# Patient Record
Sex: FEMALE
[De-identification: names, ages, dates, MRNs, and addresses within clinical notes are randomized per-mention and may not be internally consistent; named-entity substitution may affect disease eponyms.]

---

## 2018-01-03 ENCOUNTER — HOSPITAL ENCOUNTER (OUTPATIENT)
Dept: HOSPITAL 5 - SPVIMAG | Age: 48
Discharge: HOME | End: 2018-01-03
Attending: SURGERY
Payer: COMMERCIAL

## 2018-01-03 DIAGNOSIS — C50.312: Primary | ICD-10-CM

## 2018-01-03 PROCEDURE — C8908 MRI W/O FOL W/CONT, BREAST,: HCPCS

## 2018-01-03 PROCEDURE — A9577 INJ MULTIHANCE: HCPCS

## 2018-01-03 PROCEDURE — 77059: CPT

## 2018-01-05 NOTE — MAGNETIC RESONANCE REPORT
BILATERAL BREAST MRI WITHOUT AND WITH CONTRAST: 01/03/18 12:54:00



CLINICAL: Newly diagnosed left breast cancer.  Status post ultrasound 

guided needle biopsy on 12/12/17 with findings of invasive mammary 

carcinoma with ductal and lobular features, grade 2.



COMPARISON:12/12/17 left mammogram.



TECHNIQUE: Axial 1.0-mm T1 without,  axial high resolution 2.0-mm T2 

and axial 1.0-mm dynamic Vibrant high-resolution postcontrast T1 fat 

saturation sequences on a 1.5 Marlene magnet.  The examination was 

performed with an 8 channel dedicated [employer] breast coil. Post 

processing with CAD and subtraction was performed on an [employer] 

workstation.  14.0 cc of Multihance was injected without incident via a 

right antecubital vein 22-gauge INT for the contrast portion of the 

exam. Consent was obtained prior to the administration of the contrast. 

 



FINDINGS:



Right: Mild background parenchymal enhancement.  No mass or suspicious 

enhancement.  No suspicious right axillary or right internal mammary 

lymph nodes.



Left: Mild background parenchymal enhancement.  The known cancer is an 

irregular enhancing mass at 7 o'clock 6.6 cm from the nipple measuring 

16.2 x 10.6 x 9.3 mm.  It demonstrates heterogeneous enhancement with 

mixed kinetics, 148% peak enhancement in 10% type III washout.  Lesion 

2 is a suspicious irregular enhancing mass at 6:30 o'clock 5.3 cm from 

the nipple measuring 11.4 x 6.3 x 4.6 mm.  It demonstrates 

heterogeneous enhancement with mixed kinetics, 154% peak enhancement in 

1% type III washout.  Lesion 3 is an irregular enhancing mass at 5 

o'clock 6.1 cm from the nipple measuring 15.6 x 11.3 x 7.3 mm.  It 

demonstrates heterogeneous enhancement with mixed kinetics, 199% peak 

enhancement and 7% type III washout.

The known cancer and lesion 2 span approximately 3 cm in a ductal 

orientation toward the nipple.  Lesion 3 is approximately 2.8 cm 

lateral to the known cancer and approximately 2.4 cm posterior and 

lateral from lesion 2.  No suspicious left axillary or left internal 

mammary lymph nodes.



IMPRESSION: Known left breast cancer at 7 o'clock and 2 additional 

highly suspicious masses of the left breast at 6 o'clock and 5 o'clock. 

 Negative right breast.  No suspicious lymph nodes.



RIGHT BI-RADS  1 -- Negative





LEFT BI-RADS 6 -- Known Cancer

## 2018-01-25 ENCOUNTER — HOSPITAL ENCOUNTER (OUTPATIENT)
Dept: HOSPITAL 5 - SPVIMAG | Age: 48
Discharge: HOME | End: 2018-01-25
Attending: SURGERY
Payer: COMMERCIAL

## 2018-01-25 DIAGNOSIS — C50.312: ICD-10-CM

## 2018-01-25 DIAGNOSIS — N64.9: Primary | ICD-10-CM

## 2018-01-25 PROCEDURE — 19086 BX BREAST ADD LESION MR IMAG: CPT

## 2018-01-25 PROCEDURE — 88305 TISSUE EXAM BY PATHOLOGIST: CPT

## 2018-01-25 PROCEDURE — A9577 INJ MULTIHANCE: HCPCS

## 2018-01-25 PROCEDURE — 19085 BX BREAST 1ST LESION MR IMAG: CPT

## 2018-01-25 PROCEDURE — 77065 DX MAMMO INCL CAD UNI: CPT

## 2018-01-25 NOTE — MAGNETIC RESONANCE REPORT
MRI GUIDED VACUUM ASSISTED CORE BIOPSY AT TWO SITES LEFT BREAST: 

01/25/18 



CLINICAL: Known left breast cancer and 7 o'clock.  2 additional 

suspicious lesions of the left breast.



COMPARISON: 01/03/18 MRI



FINDINGS: Consent for the procedure was obtained.  A Vibrant dynamic 

postcontrast series was performed on a 1.5 Marlene magnet using an 8 

channel Sentinelle dedicated breast coil.  15.0 cc of Multihance was 

injected intravenously without incident via a right antecubital vein 

20-gauge INT. The previously identified additional suspicious lesions 

were localized and targeted using [employer] Sentinelle biopsy software. 



The skin was anesthetized with 1% lidocaine and small dermatotomies 

were performed.  2% lidocaine was administered for deeper anesthesia.  

9-G biopsy was performed with an [employer] vacuum assisted device.  

Imaging demonstrated satisfactory positioning of the probe at the two 

sites and satisfactory samples were obtained.  Clips were placed at 

both sites after confirmation of adequate sampling. The probes were 

removed and hemostasis was achieved with pressure to the sites.  

Sterile dressings were applied.





The patient tolerated the procedure well and there were no apparent 

complications. A two view mammogram demonstrated concordant placement 

of both clips.



The patient left the department in good condition and was given 

instructions instructions for wound care and follow-up. 



IMPRESSION: Uncomplicated MRI biopsy with clip placement at two sites 

left breast.

## 2018-01-25 NOTE — MAGNETIC RESONANCE REPORT
MRI GUIDED VACUUM ASSISTED CORE BIOPSY AT TWO SITES LEFT BREAST: 

01/25/18 



CLINICAL: Known left breast cancer and 7 o'clock.  2 additional 

suspicious lesions of the left breast.



COMPARISON: 01/03/18 MRI



FINDINGS: Consent for the procedure was obtained.  A Vibrant dynamic 

postcontrast series was performed on a 1.5 Marlene magnet using an 8 

channel Sentinelle dedicated breast coil.  15.0 cc of Multihance was 

injected intravenously without incident via a right antecubital vein 

20-gauge INT. The previously identified additional suspicious lesions 

were localized and targeted using [employer] Sentinelle biopsy software. 



The skin was anesthetized with 1% lidocaine and small dermatotomies 

were performed.  2% lidocaine was administered for deeper anesthesia.  

9-G biopsy was performed with an [employer] vacuum assisted device.  

Imaging demonstrated satisfactory positioning of the probe at the two 

sites and satisfactory samples were obtained.  Clips were placed at 

both sites after confirmation of adequate sampling. The probes were 

removed and hemostasis was achieved with pressure to the sites.  

Sterile dressings were applied.





The patient tolerated the procedure well and there were no apparent 

complications. A two view mammogram demonstrated concordant placement 

of both clips.



The patient left the department in good condition and was given 

instructions instructions for wound care and follow-up. 



IMPRESSION: Uncomplicated MRI biopsy with clip placement at two sites 

left breast.

## 2018-01-26 NOTE — MAMMOGRAPHY REPORT
LEFT DIGITAL DIAGNOSTIC MAMMOGRAM: 01/25/18 08:04:00



CLINICAL: For clip placement immediately status post MRI guided needle 

biopsy at 2 sites.



COMPARISON:12/12/17



FINDINGS: Two  biopsy clips at 6 o'clock and 6:30 o'clock correlate 

with the areas biopsied by MRI guidance and they are labeled 1 and 2 

respectively.A third clip correlates with the known cancer at 7 

o'clock. 





IMPRESSION: Concordant clip placement status post MRI biopsy at 2 sites.



BI-RADS CATEGORY:  6 -- Known Cancer



Pathology pending.

## 2018-01-30 ENCOUNTER — HOSPITAL ENCOUNTER (OUTPATIENT)
Dept: HOSPITAL 5 - SPVIMAG | Age: 48
Discharge: HOME | End: 2018-01-30
Attending: SURGERY
Payer: COMMERCIAL

## 2018-01-30 DIAGNOSIS — R92.8: ICD-10-CM

## 2018-01-30 DIAGNOSIS — C50.912: Primary | ICD-10-CM

## 2018-01-30 DIAGNOSIS — N64.89: ICD-10-CM

## 2018-01-30 PROCEDURE — A4648 IMPLANTABLE TISSUE MARKER: HCPCS

## 2018-01-30 PROCEDURE — 76642 ULTRASOUND BREAST LIMITED: CPT

## 2018-01-30 PROCEDURE — 77065 DX MAMMO INCL CAD UNI: CPT

## 2018-01-30 PROCEDURE — 76998 US GUIDE INTRAOP: CPT

## 2018-01-31 ENCOUNTER — HOSPITAL ENCOUNTER (OUTPATIENT)
Dept: HOSPITAL 5 - OR | Age: 48
Discharge: HOME | End: 2018-01-31
Attending: SURGERY
Payer: COMMERCIAL

## 2018-01-31 VITALS — SYSTOLIC BLOOD PRESSURE: 107 MMHG | DIASTOLIC BLOOD PRESSURE: 56 MMHG

## 2018-01-31 DIAGNOSIS — Z98.890: ICD-10-CM

## 2018-01-31 DIAGNOSIS — Z85.3: ICD-10-CM

## 2018-01-31 DIAGNOSIS — J45.909: ICD-10-CM

## 2018-01-31 DIAGNOSIS — E03.9: ICD-10-CM

## 2018-01-31 DIAGNOSIS — Z90.710: ICD-10-CM

## 2018-01-31 DIAGNOSIS — C50.312: Primary | ICD-10-CM

## 2018-01-31 PROCEDURE — 88333 PATH CONSLTJ SURG CYTO XM 1: CPT

## 2018-01-31 PROCEDURE — 19125 EXCISION BREAST LESION: CPT

## 2018-01-31 PROCEDURE — 19281 PERQ DEVICE BREAST 1ST IMAG: CPT

## 2018-01-31 PROCEDURE — 76098 X-RAY EXAM SURGICAL SPECIMEN: CPT

## 2018-01-31 PROCEDURE — 19282 PERQ DEVICE BREAST EA IMAG: CPT

## 2018-01-31 PROCEDURE — A9541 TC99M SULFUR COLLOID: HCPCS

## 2018-01-31 PROCEDURE — 78800 RP LOCLZJ TUM 1 AREA 1 D IMG: CPT

## 2018-01-31 PROCEDURE — 19301 PARTIAL MASTECTOMY: CPT

## 2018-01-31 PROCEDURE — 38525 BIOPSY/REMOVAL LYMPH NODES: CPT

## 2018-01-31 PROCEDURE — 88307 TISSUE EXAM BY PATHOLOGIST: CPT

## 2018-01-31 PROCEDURE — 88342 IMHCHEM/IMCYTCHM 1ST ANTB: CPT

## 2018-01-31 NOTE — MAMMOGRAPHY REPORT
SPECIMEN RADIOGRAPH LEFT BREAST: 01/31/18 05:49:00





CLINICAL: Surgical excision of known cancer at 7 o'clock and an 

additional suspicious lesion at 5 o'clock.



FINDINGS: The targeted biopsy clips at 7 o'clock and 5 o'clock are 

identified within the specimen.  A third clip corresponds to the recent 

MRI biopsy 2.



IMPRESSION: Excision of the targeted lesions.

## 2018-01-31 NOTE — ANESTHESIA DAY OF SURGERY
Anesthesia Day of Surgery





- Day of Surgery


Patient Examined: Yes


Patient H&P Reviewed: Yes


Patient is NPO: Yes


Beta Blockers: Yes


Cardiac Clearance: Yes


Pulmonary Clearance: Yes


Ronaldo's Test: N/A

## 2018-01-31 NOTE — OPERATIVE REPORT
Operative Report


Operative Report: 





Date of Service: January 31, 2018





Preoperative diagnosis: Left breast cancer of the lower inner quadrant





Postoperative diagnosis: Same





Procedure: Left needle localization partial mastectomy of the lower inner 

quadrant and SLNB





Surgeon: Prachi Fisher MD





Assistant: Taty Ortiz MD





Anesthesia: General





Findings: Left wire and clips present within radiograph specimen; 4 SLNs





Complications: None





EBL: Minimal





Disposition: PACU in good condition





Indications for operative procedure: This is a 47year old lady with newly 

diagnosed Stage I left breast cancer of the lower inner quadrant. 

Recommendations are to proceed with a partial mastectomy. She wished to proceed 

with the above. 





Procedure in detail: The patient was taken to radiology for wire placement for 

localization of known area of cancer and additional area of concern at the 5:00 

position. Patient was then taken to the operating room. Gen. anesthesia was 

administered. The left nipple was injected with radioisotope. The left breast 

and axilla were prepped and draped in the normal sterile operative fashion. 

Timeout was performed. The wire was identified. Gamma probe was inserted into 

the axilla. The area of hot spot was identified. A left axillary incision was 

made with a 15 blade knife with dissection taken down to the subcutaneous 

tissues. The axillary fascia was opened with the Bovie cautery. 4 SLNS were 

identified and dissected free.  All remaining counts were less than 10% of the 

highest count. Lymph nodes were sent to pathology for permanent processing. 

Hemostasis was obtained in the left axillary cavity. Axillary cavity was 

appropriately irrigated and suctioned. Hemostasis was noted. Axillary fascia 

was approximated and closed using interrupted 3-0 Vicryl and the skin brought 

together and closed using a running 4-0 Monocryl followed by skin affix.





Attention was then taken towards the left breast. 2 wires were placed to 

localize the areas of concern. Ultrasound was used to identify the clip at the 5

:00 position and  known cancer. Lower inner breast incision was made with a 15 

blade knife and dissection taken down to subcutaneous tissues. First began 

raising of the lateral flap with removal of the wire from the skin with 

dissection take down to the pectoralis muscle, followed by raising of the 

medial flap, superior flap and then inferior flap with all flaps taken down to 

the pectoralis muscle. The breast area of concern was appropriately removed 

posteriorly from the pectoralis muscle with the aid of the Bovie cautery. The 

wires were not encountered. Specimen was marked and then sent to pathology and 

radiology; radiograph specimen with wire and clips present. Discusssed with Dr. Perera, 3 clips present with one clip marking the known cancer and 2 additional 

clips marking the areas of concern; 4th clip not present and Dr. Perera 

reported that was the discordant clip and not the clip marking the area of 

concern. Breast cavity was irrigated and hemostasis was obtained. Breast cavity 

defect was 7x5 cm, proceeded with oncoplastic closure with medial and lateral 

mobilization.The breast cavity was anesthetized with 1% lidocaine mixed with 

quarter percent Marcaine. The posterior deep breast tissues were approximated 

and closed using interrupted 3-0 Vicryl in layers. The subcutaneous tissues 

were approximated and closed using interrupted 3-0 Vicryl followed by closing 

of the skin with a running 4-0 Monocryl and skin affix. The patient tolerated 

surgery very well and she was awaken from anesthesia without any complication 

and transported to PACU in good condition.

## 2018-01-31 NOTE — ULTRASOUND REPORT
ULTRASOUND GUIDED CLIP PLACEMENT LEFT BREAST: 01/30/18



Clinical: Known left breast cancer and status post recent MRI guided 

biopsy of two additional lesions with discordant pathology results.  

This study is being done to identify and place a clip at 5 o'clock at 

the site of a recent MRI a biopsy.  



Finding: Ultrasound of the left breast demonstrated a solid irregular 

hypoechoic shadowing mass at 5 o'clock 5 cm from the nipple which 

correlates with the suspicious finding on MRI.  It measures 7 x 6 x 5 

mm.  Mild fluid at 5:30 o'clock 5 cm from the nipple measures 1.0 x 0.2 

cm.  The known cancer is a 7 o'clock with a biopsy clip.  A hematoma at 

6 o'clock 5 cm from the nipple measures 2.2 x 0.6 cm.



With ultrasound guidance and 1% lidocaine for local anesthesia, a 

localizer clip was placed within the mass at 5 o'clock.  The patient 

tolerated the procedure well and there were no apparent complications.  

A post procedure mammogram demonstrated for biopsy clips.  The new clip 

is at 5 o'clock is concordant with the MRI finding in the lateral 

breast.  



IMPRESSION: Known cancer at 7 o'clock and a suspicious 7 mm mass at 5 

o'clock 5 cm from the nipple.  A localizer clip was placed within the 

mass at 5 o'clock.

## 2018-01-31 NOTE — SHORT STAY SUMMARY
Short Stay Documentation


Date of service: 01/31/18





- History


H&P: obtained from office





- Allergies and Medications


Current Medications: 


 Allergies





cefazolin [From Ancef] Allergy (Verified 01/30/18 15:03)


 Hives


hydrocodone [From Lortab] Allergy (Verified 01/30/18 15:03)


 Hives


vancomycin Allergy (Verified 01/30/18 15:03)


 Hives





 Home Medications











 Medication  Instructions  Recorded  Confirmed  Last Taken  Type


 


Albuterol Sulfate [Ventolin Hfa] 1 inh IH PRN PRN 01/30/18 01/31/18 01/27/18 

History


 


Budesonide/Formoterol Fumarate 10.2 gm IH DAILY 01/30/18 01/31/18 01/31/18 04:

30 History





[Symbicort 80-4.5 Mcg Inhaler]     


 


Levothyroxine [Synthroid] 50 mcg PO QAM 01/30/18 01/31/18 01/31/18 04:30 History


 


Montelukast [Singulair] 10 mg PO QPM 01/30/18 01/31/18 01/30/18 History


 


RX: Ibuprofen 800 mg PO Q8HR PRN #30 tablet 01/31/18  Unknown Rx








Active Medications





Hydromorphone HCl (Dilaudid)  0.5 mg IV Q10MIN PRN


   PRN Reason: Pain , Severe (7-10)


   Stop: 01/31/18 13:00


Lactated Ringer's (Lactated Ringers)  1,000 mls @ 42 mls/hr IV AS DIRECT JACKY


   Last Admin: 01/31/18 08:45 Dose:  42 mls/hr


Clindamycin HCl (Cleocin 600 Mg/50 Ml)  600 mg in 50 mls @ 100 mls/hr IV PREOP 

NR


   Stop: 01/31/18 12:00











- Brief post op/procedure progress note


Date of procedure: 01/31/18


Pre-op diagnosis: Left breast cancer of the lower inner quadrant


Post-op diagnosis: same


Procedure: 





Left needle localization partial mastectomy and SLNB


Anesthesia: GETA


Findings: 





Wire and clips present within partial mastectomy radiograph specimen; 4 SLNs


Surgeon: KAY HENDERSON


Assistant: PALAK RODGERS


Estimated blood loss: minimal


Pathology: list (left partial mastectomy and SLNB)


Specimen disposition: to lab


Condition: stable





- Disposition


Condition at discharge: Good


Disposition: DC-01 TO HOME OR SELFCARE





Short Stay Discharge Plan


Activity: other (no heavy lifting)


Diet: regular


Wound: other (keep incision clean and dry; may shower in 48 hours, no baths, 

pools or lakes; do not rub or scrub incision)


Follow up with: 


SHAY CLEMENT MD [Primary Care Provider] - 7 Days


KAY HENDERSON MD [Staff Physician] - 7 Days


Prescriptions: 


RX: Ibuprofen 800 mg PO Q8HR PRN #30 tablet


 PRN Reason: Pain

## 2018-01-31 NOTE — MAMMOGRAPHY REPORT
NEEDLE LOCALIZATION AND HOOKWIRE PLACEMENT X2 LEFT BREAST:01/31/18 



CLINICAL: Known left breast cancer at 7 o'clock and a suspicious mass 

at 5 o'clock.



COMPARISON: 01/30/18



FINDINGS: Using mammographic guidance, 1% lidocaine local anesthesia 

and sterile technique, a 10.0-cm Santiago hookwire was placed from a 

medial approach to localize the lateral clip at 5 o'clock and a 5 cm 

Santiago hookwire was placed from a medial approach to localize the 

known cancer a 7 o'clock.  Mammographic views demonstrated satisfactory 

targeting.  Hook wires were deployed and an additional orthogonal CC 

image was obtained.  The patient tolerated the procedure well and there 

were no apparent complications.



IMPRESSION: Uncomplicated hookwire placement at two sites left breast.

## 2018-01-31 NOTE — ANESTHESIA CONSULTATION
Anesthesia Consult and Med Hx


Date of service: 01/31/18





- Airway


Anesthetic Teeth Evaluation: Good


ROM Head & Neck: Adequate


Mental/Hyoid Distance: Adequate


Mallampati Class: Class II


Intubation Access Assessment: Probably Good





- Pulmonary Exam


CTA: Yes





- Cardiac Exam


Cardiac Exam: RRR (Grade 1 CHATA)





- Pre-Operative Health Status


ASA Pre-Surgery Classification: ASA3


Proposed Anesthetic Plan: General





- Pulmonary


Hx Smoking: No


Hx Asthma: Yes (utilized regular inhaler)





- Cardiovascular System


Hx Hypertension: No


Hx Heart Murmur: Yes





- Central Nervous System


Hx Psychiatric Problems: No





- Gastrointestinal


Hx Gastroesophageal Reflux Disease: No





- Endocrine


Hx Hypothyroidism: Yes





- Other Systems


Hx Alcohol Use: No


Hx Substance Use: No


Hx Cancer: Yes

## 2018-01-31 NOTE — MAMMOGRAPHY REPORT
LEFT DIGITAL DIAGNOSTIC MAMMOGRAM: 01/30/18 08:06:00



CLINICAL: Known cancer at 7 o'clock.  For clip placement immediately 

status post ultrasound guided placement of a clip at 5 o'clock 5 cm 

from the nipple.  This new clip has been placed within a suspicious 

mass that correlates with a suspicious finding on the recent MRI.  This 

clip placement necessitated by discordant path results from recent MRI 

biopsy at 5 o'clock.



COMPARISON:01/25/18



FINDINGS: A new biopsy clip is identified at 5 o'clock and correlates 

with the mass identified on today's ultrasound.  The known cancer is a 

7 o'clock and 2 additional biopsy clips at 5:30 and 6:30 o'clock were 

placed at recent MRI biopsy which yielded negative pathology. 





IMPRESSION: Concordant clip placement.



BI-RADS CATEGORY:  6--Known cancer



Pathology pending.

## 2018-02-21 ENCOUNTER — HOSPITAL ENCOUNTER (OUTPATIENT)
Dept: HOSPITAL 5 - OR | Age: 48
Setting detail: OBSERVATION
LOS: 1 days | Discharge: HOME | End: 2018-02-22
Attending: SURGERY | Admitting: SURGERY
Payer: COMMERCIAL

## 2018-02-21 DIAGNOSIS — Z90.12: ICD-10-CM

## 2018-02-21 DIAGNOSIS — E03.9: ICD-10-CM

## 2018-02-21 DIAGNOSIS — C50.512: ICD-10-CM

## 2018-02-21 DIAGNOSIS — Z80.3: ICD-10-CM

## 2018-02-21 DIAGNOSIS — C50.312: Primary | ICD-10-CM

## 2018-02-21 PROCEDURE — C1788 PORT, INDWELLING, IMP: HCPCS

## 2018-02-21 PROCEDURE — G0378 HOSPITAL OBSERVATION PER HR: HCPCS

## 2018-02-21 PROCEDURE — 64450 NJX AA&/STRD OTHER PN/BRANCH: CPT

## 2018-02-21 PROCEDURE — 88305 TISSUE EXAM BY PATHOLOGIST: CPT

## 2018-02-21 PROCEDURE — 19302 P-MASTECTOMY W/LN REMOVAL: CPT

## 2018-02-21 PROCEDURE — 88307 TISSUE EXAM BY PATHOLOGIST: CPT

## 2018-02-21 PROCEDURE — 96374 THER/PROPH/DIAG INJ IV PUSH: CPT

## 2018-02-21 PROCEDURE — 76937 US GUIDE VASCULAR ACCESS: CPT

## 2018-02-21 PROCEDURE — 96375 TX/PRO/DX INJ NEW DRUG ADDON: CPT

## 2018-02-21 PROCEDURE — 36561 INSERT TUNNELED CV CATH: CPT

## 2018-02-21 PROCEDURE — 77001 FLUOROGUIDE FOR VEIN DEVICE: CPT

## 2018-02-21 RX ADMIN — TRAMADOL HYDROCHLORIDE PRN MG: 50 TABLET, COATED ORAL at 17:51

## 2018-02-21 RX ADMIN — TRAMADOL HYDROCHLORIDE PRN MG: 50 TABLET, COATED ORAL at 22:13

## 2018-02-21 NOTE — OPERATIVE REPORT
Operative Report


Operative Report: 





Date of Service: February 21, 2018





Preoperative diagnosis: Left breast cancer of the lower inner/outer quadrant





Postoperative diagnosis: Same





Procedure: Left lateral breast margin revision and left axillary lymph node 

dissection





Surgeon: Prachi Fisher MD





Assistant: Taty Ortiz DO





Anesthesia: General





Findings: Left lateral breast margin revision and left ALND





Complications: None





Drains: 19 [place] SHANNAN





EBL: Minimal





Disposition: PACU in good condition





Indications for operative procedure: This is a 47 year old lady with newly 

diagnosed Stage II multifocal left breast cancer of the lower inner and lower 

outer quadrant. Patient with known left axillary positive metastatic lymph node 

with one axillary lymph node with extranodal extension and recommendations for 

ALND. Left lateral breast margin unable to be assessed due to pathology with 

cutting of the specimen and recommendations for lateral margin revision. 

Patient will undergo adjuvant chemotherapy and port placement by Dr. Ortiz. 

Patient wished to proceed with the above procedures.





Procedure in detail: Patient was then taken to the operating room. Gen. 

anesthesia was administered. Left breast was prepped and draped in the normal 

sterile operative fashion. Timeout was performed. Port was placed by Dr. Ortiz 

on right chest. Attention was then taken towards the left breast and axilla. 

Left lower inner quadrant breast incision was noted and opened with 15 blade 

knife. Seroma was drained. The lateral margin was then revised using bovie 

cautery. Specimen was marked and sent to pathology. Hemostasis was obtained 

using bovie cautery. Deep breast tissues were approximated and closed using 3-0 

Vicryl and skin closed using running 4-0 Monocryl followed by skin affix. 





Attention was then taken towards the left axilla.   A lazy S axillary incision 

was made with a 15 blade knife to include prior incision. Dissection was taken 

down to the subcutaneous tissues.  First began opening of the axillary fascia 

with scar tissue noted.  The lattismus dorsi muscle was identified and followed 

superiorly. Then proceeded with ALND dissection with identification of the 

axillary vein followed by identification of the thoracodorsal bundle and long 

thoracic nerve.  Axillary lymph nodes were then removed from the above 

boundaries, inferior to the axillary vein and between long thoracic nerver and 

thoracodorsal bundle with the aid of the bovie cautery and sweeping-like motion 

and then sent to pathology.   Both nerves were identified and unharmed.  

Hemostasis was noted.  19 [place] SHANNAN drain was placed and sutured in.  Axillary 

fascia was approximated and closed using interrupted 3-0 Vicryl and skin 

brought together and closed using a  running 4-0 Monocryl followed by skin 

affix.  





 She tolerated surgery very well and was awaken from anesthesia without any 

complication and then transported to PACU in good condition.

## 2018-02-21 NOTE — ANESTHESIA CONSULTATION
Anesthesia Consult and Med Hx


Date of service: 02/21/18





- Airway


Anesthetic Teeth Evaluation: Poor


ROM Head & Neck: Adequate


Mental/Hyoid Distance: Adequate


Mallampati Class: Class II


Intubation Access Assessment: Probably Good





- Pulmonary Exam


CTA: Yes





- Cardiac Exam


Cardiac Exam: RRR





- Pre-Operative Health Status


ASA Pre-Surgery Classification: ASA2


Proposed Anesthetic Plan: General


Nerve Block: PEC





- Pulmonary


Hx Smoking: No


Hx Asthma: Yes (utilized regular inhaler)





- Cardiovascular System


Hx Hypertension: No


Hx Heart Murmur: Yes





- Central Nervous System


Hx Psychiatric Problems: No





- Gastrointestinal


Hx Gastroesophageal Reflux Disease: No





- Endocrine


Hx Hypothyroidism: Yes





- Other Systems


Hx Alcohol Use: No


Hx Substance Use: No


Hx Cancer: Yes

## 2018-02-21 NOTE — FLUOROSCOPY REPORT
PORTABLE CHEST



INDICATION: Left breast cancer.



COMPARISON: None similar.



FINDINGS: Portable, frontal chest radiograph demonstrates a right chest 

port tip about the cavoatrial junction.  Clear lungs.  Normal 

cardiomediastinal silhouette.  No acute osseous process.



CONCLUSION: Uncomplicated right chest port placement, as described.



Thank you for the opportunity to participate in this patient's care.

## 2018-02-21 NOTE — SHORT STAY SUMMARY
Short Stay Documentation


Date of service: 02/21/18





- History


H&P: obtained from office





- Allergies and Medications


Current Medications: 


 Allergies





cefazolin [From Ancef] Allergy (Verified 02/20/18 12:55)


 Hives


hydrocodone [From Lortab] Allergy (Verified 02/20/18 12:55)


 Hives


levofloxacin [From Levaquin] Allergy (Verified 02/21/18 10:55)


 Hives


vancomycin Allergy (Verified 02/20/18 12:55)


 Hives





 Home Medications











 Medication  Instructions  Recorded  Confirmed  Last Taken  Type


 


Albuterol Sulfate [Ventolin Hfa] 1 inh IH PRN PRN 01/30/18 02/20/18 01/27/18 

History


 


Budesonide/Formoterol Fumarate 10.2 gm IH DAILY 01/30/18 02/21/18 02/21/18 08:

30 History





[Symbicort 80-4.5 Mcg Inhaler]     


 


Levothyroxine [Synthroid] 50 mcg PO QAM 01/30/18 02/21/18 02/21/18 08:30 History


 


Montelukast [Singulair] 10 mg PO QPM 01/30/18 02/21/18 02/20/18 20:00 History








Active Medications





Sodium Chloride (Nacl 0.9% 1000 Ml)  1,000 mls @ 100 mls/hr IV AS DIRECT JACKY


   Last Admin: 02/21/18 11:55 Dose:  100 mls/hr


Clindamycin HCl (Cleocin 600 Mg/50 Ml)  600 mg in 50 mls @ 100 mls/hr IV PREOP 

NR


   PRN Reason: Protocol


   Stop: 02/21/18 23:59


Midazolam HCl (Versed)  2 mg IV PREOP NR


   Stop: 02/21/18 23:59


   Last Admin: 02/21/18 12:42 Dose:  2 mg











- Brief post op/procedure progress note


Date of procedure: 02/21/18


Pre-op diagnosis: Left axillary lisbeth metastasis and indeterminate left breast 

margin


Post-op diagnosis: same


Procedure: 





Left ALND and left breast margin revision


Anesthesia: GETA


Findings: 





L ALND and left breast margin revision; port placement by Dr. Rodgers


Surgeon: KAY HENDERSON


Assistant: PALAK RODGERS


Estimated blood loss: minimal


Pathology: list (left ALND and left breast margin revision)


Specimen disposition: to lab


Condition: stable





- Disposition


Condition at discharge: Good


Disposition: DC/TX-02 [id number] GEN HOSP IP





Short Stay Discharge Plan


Activity: other (no heavy lifting)


Diet: regular


Wound: other (keep incision clean and dry; may shower in 24 hours; no baths, 

pools or lakes; keep SHANNAN drain site dry)


Follow up with: 


SHAY CLEMENT MD [Primary Care Provider] - 7 Days


KAY HENDERSON MD [Staff Physician] - 7 Days

## 2018-02-22 VITALS — DIASTOLIC BLOOD PRESSURE: 68 MMHG | SYSTOLIC BLOOD PRESSURE: 103 MMHG

## 2018-02-22 RX ADMIN — TRAMADOL HYDROCHLORIDE PRN MG: 50 TABLET, COATED ORAL at 08:37

## 2018-02-22 RX ADMIN — TRAMADOL HYDROCHLORIDE PRN MG: 50 TABLET, COATED ORAL at 04:28

## 2018-02-22 NOTE — PROGRESS NOTE
Assessment and Plan





This is a 47 year old lady with Stage II left breast cancer, POD#1 left ALND, 

left breast margin revision and left port placement.








1. No acute events overnight.


2. Pain well controlled.


3. Left breast, left axilla and right chest incisions healing well. SHANNAN drain to 

bulb suction.


4. OOB to hallway.


5. D/C planning for today.





Subjective


Date of service: 02/22/18


Principal diagnosis: Stage II left breast cancer


Interval history: 





POD#1 l ALND, left breast margin revision and right port placement





Objective





- Constitutional


Vitals: 


 Vital Signs - 12hr











  02/22/18 02/22/18





  00:00 04:00


 


Temperature 98.6 F 98.7 F


 


Pulse Rate 70 65


 


Respiratory 18 16





Rate  


 


Blood Pressure 98/65 103/69





[Right]  











General appearance: Present: no acute distress





- EENT


Eyes: PERRL, EOM intact


ENT: hearing intact, clear oral mucosa, dentition normal


Ears: bilateral: normal





- Neck


Neck: supple





- Respiratory


Respiratory effort: normal


Respiratory: bilateral: CTA





- Breasts


Breasts: other (left breast incision healing well; left axillary incision 

healing well; SHANNAN drain to bulb suction; right port incision healing well; no 

fluid collections)





- Cardiovascular


Rhythm: regular


Extremities: no ischemia, pulses intact, pulses symmetrical, No edema, normal 

temperature, normal color, Full ROM





- Gastrointestinal


General gastrointestinal: Present: soft, non-tender, non-distended





- Integumentary


Integumentary: clear, warm, dry





- Musculoskeletal


Musculoskeletal: strength equal bilaterally





- Neurologic


Neurologic: CNII-XII intact, moves all extremities





- Psychiatric


Psychiatric: appropriate mood/affect, intact judgment & insight, memory intact, 

cooperative

## 2019-06-18 NOTE — MAMMOGRAPHY REPORT
LEFT DIGITAL DIAGNOSTIC MAMMOGRAM with CAD: 06/18/19 08:45:00



CLINICAL: Followup left breast cancer status post partial mastectomy 

01/31/18



COMPARISON:12/18/18



FINDINGS: The breast is heterogeneously dense with a stable pattern.  

Stable lower inner postsurgical scar with a biopsy clip.  Stable 

moderate skin thickening of the breast.No mass, suspicious 

architectural distortion or suspicious calcifications.   







IMPRESSION: No mammographic evidence of malignancy.



BI-RADS CATEGORY: 2 - - Benign



RECOMMENDATION: Routine mammographic screening.





COMMENT:

      1.   Dense breast tissue, i.e., adenosis, fibrocystic 

            changes, etc., may obscure an underlying neoplasm.

      2.   Approximately 10% of cancers are not detected with

            mammography.

      3.   A negative mammography report should not delay biopsy 

            if a clinically suspicious mass is present.





COMMENT:

Patient follow-up letters are generated by our [employer] application.